# Patient Record
Sex: FEMALE | Race: WHITE
[De-identification: names, ages, dates, MRNs, and addresses within clinical notes are randomized per-mention and may not be internally consistent; named-entity substitution may affect disease eponyms.]

---

## 2017-06-12 ENCOUNTER — HOSPITAL ENCOUNTER (OUTPATIENT)
Dept: HOSPITAL 80 - FIMAGING | Age: 57
End: 2017-06-12
Attending: FAMILY MEDICINE
Payer: COMMERCIAL

## 2017-06-12 DIAGNOSIS — Z80.3: ICD-10-CM

## 2017-06-12 DIAGNOSIS — Z12.31: Primary | ICD-10-CM

## 2017-06-12 PROCEDURE — G0202 SCR MAMMO BI INCL CAD: HCPCS

## 2018-08-17 ENCOUNTER — HOSPITAL ENCOUNTER (OUTPATIENT)
Dept: HOSPITAL 80 - BMCIMAGING | Age: 58
End: 2018-08-17
Attending: FAMILY MEDICINE
Payer: COMMERCIAL

## 2018-08-17 DIAGNOSIS — Z12.31: Primary | ICD-10-CM

## 2018-08-17 DIAGNOSIS — Z80.3: ICD-10-CM

## 2018-11-11 ENCOUNTER — HOSPITAL ENCOUNTER (EMERGENCY)
Dept: HOSPITAL 80 - FED | Age: 58
Discharge: HOME | End: 2018-11-11
Payer: COMMERCIAL

## 2018-11-11 VITALS — SYSTOLIC BLOOD PRESSURE: 138 MMHG | DIASTOLIC BLOOD PRESSURE: 83 MMHG

## 2018-11-11 DIAGNOSIS — S52.572A: Primary | ICD-10-CM
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
## 2018-11-11 NOTE — EDPHY
General


Time Seen by Provider: 11/11/18 13:24


Narrative: 





CHIEF COMPLAINT: 


Fall, wrist fracture





HISTORY OF PRESENT ILLNESS: 


Patient presents from urgent care with reports of fall and left wrist fracture.

  She states that she was shoveling snow this morning around 9:00 a.m. When she 

slipped and fell on outstretched left hand.  She reports sudden onset is severe 

pain left wrist.  She denies any head strike or loss of consciousness or any 

pain anywhere else on her person.  Pain worse palpation movement.  She reports 

some decreased cold sensation left thumb and index finger.  No numbness, 

tingling or weakness.  She was seen at an urgent care where she was diagnosed 

with a wrist fracture, placed in a splint and sent to our facility "for you to 

set it." No radiating pain.  No pain left elbow or shoulder.  No other 

associated complaints or modifying factors.





DOMINANT EXTREMITY:


Right-hand dominant





ESTABLISHED ORTHOPEDIST:


Dr. Rylan Garcia





REVIEW OF SYSTEMS:


Ten systems reviewed and are negative unless otherwise noted in the HPI








PAST MEDICAL HISTORY: 


denies





PAST SURGICAL HISTORY:


Denies





SOCIAL HISTORY:


Nonsmoker.  Lives independently.





FAMILY HISTORY:


Noncontributory





EXAMINATION:


General Appearance:  Alert, no distress


Cardiovascular:  Good signs of perfusion to the left fingers with brisk cap 

refill.


Neurological:  A&O, light sensory symmetric.  Strength not tested in her 

splint.  Reports decreased cold sensation to the left thumb and index finger.


Skin:  Warm and dry, no rash.  No cyanosis or pallor of the fingers left hand.


Extremities:  Left upper extremity in a sugar-tong splint.  I did not remove 

this.  There is no tenderness in the exposed fingers.








DIFFERENTIAL DIAGNOSES:


Including but not limited to wrist fracture, wrist sprain, acute carpal tunnel, 

radial nerve injury








MDM:


1:35 p.m.


Mechanical fall with left wrist sprain and closed left distal radius fracture.  

This is intra-articular with compaction and minimal dorsal angulation.  I 

reviewed the x-ray.  I have discussed with the patient the risks, benefits and 

alternatives of close reduction versus remaining her splint for outpatient open 

reduction internal fixation.  Given her x-ray appearance and her minimal pain 

at rest, I do not feel that the risks of sedation and closed reduction outweigh 

the benefit of doing so.  Furthermore, conversation was had between Dr. Dejesus 

and urgent care provider.  He informed the urgent care provider that we would 

be happy to evaluate the patient, but that he felt that closed reduction is 

unlikely to benefit the patient unless she is in intractable pain or has 

evidence of nerve entrapment.  Patient has no evidence of nerve entrapment and 

her pain is minimal.  I did offer the reduction she has declined.  I do feel it 

is reasonable at this time to keep her in her splint for outpatient definitive 

open reduction internal fixation.  We discussed ED precautions.  We discussed 

ice and elevation.  We discussed Tylenol but avoidance of ibuprofen or Aleve 

until seen by orthopedist.  She is comfortable this plan and discharged home 

stable condition.





SUPERVISION:


Patient was independently examined, but I discussed the case with my secondary 

supervising physician Dr. Dejesus














- History


Smoking Status: Never smoked





- Objective


Vital Signs: 


 Initial Vital Signs











Temperature (C)  98.8 F   11/11/18 13:20


 


Heart Rate  88   11/11/18 13:20


 


Respiratory Rate  18   11/11/18 13:20


 


Blood Pressure  138/83 H  11/11/18 13:20


 


O2 Sat (%)  93   11/11/18 13:20








 











O2 Delivery Mode               Room Air














Allergies/Adverse Reactions: 


 





eye dialtor drops Allergy (Uncoded 11/11/18 13:20)


 








Home Medications: 














 Medication  Instructions  Recorded


 


oxyCODONE IR [Oxycodone Ir (*)] 5 - 10 mg PO Q4-6PRN PRN #12 tab 11/11/18














Departure





- Departure


Disposition: Home, Routine, Self-Care


Clinical Impression: 


Distal radius fracture, left


Qualifiers:


 Encounter type: initial encounter Fracture type: closed Fracture morphology: 

other intra-articular Qualified Code(s): S52.572A - Other intraarticular 

fracture of lower end of left radius, initial encounter for closed fracture





Left wrist sprain


Qualifiers:


 Encounter type: initial encounter Qualified Code(s): S63.502A - Unspecified 

sprain of left wrist, initial encounter





Condition: Good


Instructions:  Wrist Fracture in Adults (ED)


Additional Instructions: 


1. Contact orthopedist for outpatient definitive care.  This is a surgical 

fracture


2. Ice, elevation often


3. Tylenol 650 mg every 6 hr as needed for pain.  Do not take any anti-

inflammatories until seen by orthopedist


4. Pain medication as prescribed as needed 


5. ED precautions for numbness, weakness, intractable pain


Referrals: 


Frankel,Zara, MD [Primary Care Provider] - As per Instructions


Cortes Llamas MD [Medical Doctor] - As per Instructions


Prescriptions: 


oxyCODONE IR [Oxycodone Ir (*)] 5 - 10 mg PO Q4-6PRN PRN #12 tab


 PRN Reason: Pain, Moderate

## 2018-12-20 ENCOUNTER — HOSPITAL ENCOUNTER (OUTPATIENT)
Dept: HOSPITAL 80 - FIMAGING | Age: 58
End: 2018-12-20
Attending: FAMILY MEDICINE
Payer: COMMERCIAL

## 2018-12-20 DIAGNOSIS — Z13.820: Primary | ICD-10-CM

## 2018-12-20 DIAGNOSIS — Z78.0: ICD-10-CM

## 2018-12-20 DIAGNOSIS — M81.0: ICD-10-CM
